# Patient Record
Sex: MALE | Race: WHITE | NOT HISPANIC OR LATINO | Employment: OTHER | URBAN - METROPOLITAN AREA
[De-identification: names, ages, dates, MRNs, and addresses within clinical notes are randomized per-mention and may not be internally consistent; named-entity substitution may affect disease eponyms.]

---

## 2019-07-01 ENCOUNTER — APPOINTMENT (OUTPATIENT)
Dept: GENERAL RADIOLOGY | Facility: CLINIC | Age: 64
End: 2019-07-01
Attending: EMERGENCY MEDICINE
Payer: COMMERCIAL

## 2019-07-01 ENCOUNTER — HOSPITAL ENCOUNTER (EMERGENCY)
Facility: CLINIC | Age: 64
Discharge: HOME | End: 2019-07-01
Attending: EMERGENCY MEDICINE | Admitting: EMERGENCY MEDICINE
Payer: COMMERCIAL

## 2019-07-01 ENCOUNTER — APPOINTMENT (OUTPATIENT)
Dept: MRI IMAGING | Facility: CLINIC | Age: 64
End: 2019-07-01
Attending: EMERGENCY MEDICINE
Payer: COMMERCIAL

## 2019-07-01 VITALS
TEMPERATURE: 97.5 F | RESPIRATION RATE: 20 BRPM | SYSTOLIC BLOOD PRESSURE: 118 MMHG | HEART RATE: 63 BPM | DIASTOLIC BLOOD PRESSURE: 60 MMHG | HEIGHT: 68 IN | OXYGEN SATURATION: 94 % | BODY MASS INDEX: 25.86 KG/M2 | WEIGHT: 170.64 LBS

## 2019-07-01 DIAGNOSIS — R42 VERTIGO: ICD-10-CM

## 2019-07-01 DIAGNOSIS — R21 RASH: ICD-10-CM

## 2019-07-01 DIAGNOSIS — J06.9 UPPER RESPIRATORY TRACT INFECTION, UNSPECIFIED TYPE: ICD-10-CM

## 2019-07-01 DIAGNOSIS — R25.1 TREMOR: ICD-10-CM

## 2019-07-01 LAB
ANION GAP SERPL CALCULATED.3IONS-SCNC: 3 MMOL/L (ref 3–14)
APTT PPP: 33 SEC (ref 22–37)
BASOPHILS # BLD AUTO: 0 10E9/L (ref 0–0.2)
BASOPHILS NFR BLD AUTO: 0.4 %
BUN SERPL-MCNC: 9 MG/DL (ref 7–30)
CALCIUM SERPL-MCNC: 8.7 MG/DL (ref 8.5–10.1)
CHLORIDE SERPL-SCNC: 104 MMOL/L (ref 94–109)
CO2 SERPL-SCNC: 30 MMOL/L (ref 20–32)
CREAT SERPL-MCNC: 0.94 MG/DL (ref 0.66–1.25)
DIFFERENTIAL METHOD BLD: ABNORMAL
EOSINOPHIL # BLD AUTO: 0.6 10E9/L (ref 0–0.7)
EOSINOPHIL NFR BLD AUTO: 8.1 %
ERYTHROCYTE [DISTWIDTH] IN BLOOD BY AUTOMATED COUNT: 12.9 % (ref 10–15)
ETHANOL SERPL-MCNC: <0.01 G/DL
GFR SERPL CREATININE-BSD FRML MDRD: 85 ML/MIN/{1.73_M2}
GLUCOSE SERPL-MCNC: 96 MG/DL (ref 70–99)
HCT VFR BLD AUTO: 39.6 % (ref 40–53)
HGB BLD-MCNC: 13.3 G/DL (ref 13.3–17.7)
IMM GRANULOCYTES # BLD: 0 10E9/L (ref 0–0.4)
IMM GRANULOCYTES NFR BLD: 0.1 %
INR PPP: 1.03 (ref 0.86–1.14)
LACTATE BLD-SCNC: 0.7 MMOL/L (ref 0.7–2)
LYMPHOCYTES # BLD AUTO: 1.2 10E9/L (ref 0.8–5.3)
LYMPHOCYTES NFR BLD AUTO: 17.5 %
MCH RBC QN AUTO: 30.9 PG (ref 26.5–33)
MCHC RBC AUTO-ENTMCNC: 33.6 G/DL (ref 31.5–36.5)
MCV RBC AUTO: 92 FL (ref 78–100)
MONOCYTES # BLD AUTO: 0.6 10E9/L (ref 0–1.3)
MONOCYTES NFR BLD AUTO: 8.1 %
NEUTROPHILS # BLD AUTO: 4.6 10E9/L (ref 1.6–8.3)
NEUTROPHILS NFR BLD AUTO: 65.8 %
NRBC # BLD AUTO: 0 10*3/UL
NRBC BLD AUTO-RTO: 0 /100
PLATELET # BLD AUTO: 231 10E9/L (ref 150–450)
POTASSIUM SERPL-SCNC: 3.8 MMOL/L (ref 3.4–5.3)
RBC # BLD AUTO: 4.3 10E12/L (ref 4.4–5.9)
SODIUM SERPL-SCNC: 137 MMOL/L (ref 133–144)
TROPONIN I SERPL-MCNC: <0.015 UG/L (ref 0–0.04)
WBC # BLD AUTO: 7 10E9/L (ref 4–11)

## 2019-07-01 PROCEDURE — 85730 THROMBOPLASTIN TIME PARTIAL: CPT | Performed by: EMERGENCY MEDICINE

## 2019-07-01 PROCEDURE — 80320 DRUG SCREEN QUANTALCOHOLS: CPT | Performed by: EMERGENCY MEDICINE

## 2019-07-01 PROCEDURE — 80048 BASIC METABOLIC PNL TOTAL CA: CPT | Performed by: EMERGENCY MEDICINE

## 2019-07-01 PROCEDURE — 25000132 ZZH RX MED GY IP 250 OP 250 PS 637: Performed by: EMERGENCY MEDICINE

## 2019-07-01 PROCEDURE — 93005 ELECTROCARDIOGRAM TRACING: CPT

## 2019-07-01 PROCEDURE — 71046 X-RAY EXAM CHEST 2 VIEWS: CPT

## 2019-07-01 PROCEDURE — 83605 ASSAY OF LACTIC ACID: CPT | Performed by: EMERGENCY MEDICINE

## 2019-07-01 PROCEDURE — 85025 COMPLETE CBC W/AUTO DIFF WBC: CPT | Performed by: EMERGENCY MEDICINE

## 2019-07-01 PROCEDURE — 87040 BLOOD CULTURE FOR BACTERIA: CPT | Performed by: EMERGENCY MEDICINE

## 2019-07-01 PROCEDURE — 85610 PROTHROMBIN TIME: CPT | Performed by: EMERGENCY MEDICINE

## 2019-07-01 PROCEDURE — 25000128 H RX IP 250 OP 636: Performed by: EMERGENCY MEDICINE

## 2019-07-01 PROCEDURE — 25500064 ZZH RX 255 OP 636: Performed by: EMERGENCY MEDICINE

## 2019-07-01 PROCEDURE — 36415 COLL VENOUS BLD VENIPUNCTURE: CPT | Performed by: EMERGENCY MEDICINE

## 2019-07-01 PROCEDURE — A9585 GADOBUTROL INJECTION: HCPCS | Performed by: EMERGENCY MEDICINE

## 2019-07-01 PROCEDURE — 96360 HYDRATION IV INFUSION INIT: CPT | Mod: 59

## 2019-07-01 PROCEDURE — 70553 MRI BRAIN STEM W/O & W/DYE: CPT

## 2019-07-01 PROCEDURE — 70549 MR ANGIOGRAPH NECK W/O&W/DYE: CPT

## 2019-07-01 PROCEDURE — 99285 EMERGENCY DEPT VISIT HI MDM: CPT | Mod: 25

## 2019-07-01 PROCEDURE — 84484 ASSAY OF TROPONIN QUANT: CPT | Performed by: EMERGENCY MEDICINE

## 2019-07-01 PROCEDURE — 96361 HYDRATE IV INFUSION ADD-ON: CPT

## 2019-07-01 RX ORDER — GADOBUTROL 604.72 MG/ML
10 INJECTION INTRAVENOUS ONCE
Status: COMPLETED | OUTPATIENT
Start: 2019-07-01 | End: 2019-07-01

## 2019-07-01 RX ORDER — DIAZEPAM 5 MG
5 TABLET ORAL
Qty: 3 TABLET | Refills: 0 | Status: SHIPPED | OUTPATIENT
Start: 2019-07-01 | End: 2019-07-04

## 2019-07-01 RX ORDER — MECLIZINE HYDROCHLORIDE 25 MG/1
25 TABLET ORAL ONCE
Status: COMPLETED | OUTPATIENT
Start: 2019-07-01 | End: 2019-07-01

## 2019-07-01 RX ORDER — HYDROXYZINE HYDROCHLORIDE 10 MG/1
10 TABLET, FILM COATED ORAL 2 TIMES DAILY PRN
Qty: 10 TABLET | Refills: 0 | Status: SHIPPED | OUTPATIENT
Start: 2019-07-01

## 2019-07-01 RX ORDER — MECLIZINE HYDROCHLORIDE 25 MG/1
25 TABLET ORAL EVERY 6 HOURS PRN
Qty: 30 TABLET | Refills: 0 | Status: SHIPPED | OUTPATIENT
Start: 2019-07-01

## 2019-07-01 RX ADMIN — SODIUM CHLORIDE 500 ML: 9 INJECTION, SOLUTION INTRAVENOUS at 10:19

## 2019-07-01 RX ADMIN — GADOBUTROL 10 ML: 604.72 INJECTION INTRAVENOUS at 11:32

## 2019-07-01 RX ADMIN — MECLIZINE HYDROCHLORIDE 25 MG: 25 TABLET ORAL at 10:36

## 2019-07-01 ASSESSMENT — MIFFLIN-ST. JEOR: SCORE: 1538.5

## 2019-07-01 NOTE — ED PROVIDER NOTES
Visit Date:   07/01/2019      PRIMARY CARE PHYSICIAN:  Gabrielle Hidalgo MD       CHIEF COMPLAINT:  Vertigo, cough and tremor.      HISTORY OF PRESENT ILLNESS:  A 63-year-old male with history of previous carotid endarterectomy, previous stroke and use of chronic pain medication for back pain, who presents to the ER.  He said that 2-1/2 weeks ago, he developed upper respiratory symptoms.  He had been exposed to a relative who had pneumonia and his wife was sick.  He said he has had fevers at home, cough, runny nose, sore throat.  He has a history of COPD, had noted some minimal cough and shortness of breath but it was not severe.  He said what mainly has been bothering him is a spinning sensation, especially when he walks up or down stairs.  It has been constant over the last week and he noticed a rash starting yesterday.  He said did not see any redness, but under the skin sees some small bumps.  He has had nausea but no vomiting.  No trauma.  He said he felt a little more unsteady but that is not unusual for him.  No severe headache or neck pain.      MEDICATIONS:     1.  Valium.   2.  Oxycodone.   3.  Fentanyl.   4.  Amitriptyline.   5.  Omeprazole.   6.  Triamcinolone cream.   7.  MiraLax.      ALLERGIES:  AMBIEN, ATENOLOL, ZITHROMAX, BACLOFEN, LYRICA, METHOCARBAMOL, METOPROLOL,  MORPHINE, PROPOXYPHENE, SIMVASTATIN, TIZANIDINE, TOPAMAX.      SOCIAL HISTORY:  The patient is here with his wife.  He has had a drink for 40 years.        PAST MEDICAL HISTORY:  History of small-bowel obstruction, chronic back pain, insomnia, constipation, carotid endarterectomy, stroke, COPD.      REVIEW OF SYSTEMS:  Ten-point review of systems all negative except as in the HPI.      PHYSICAL EXAMINATION:   GENERAL:  The patient is in no respiratory distress, speaks in full sentences, makes good sense.  He does have a mild tremor.   VITAL SIGNS:  Temperature 97.5, blood pressure 118/101 with a recheck of 118/60, heart rate 78,  respirations 20, saturating 96% on room air.   HEENT/NECK:  There is fluid behind the right TM greater than the left.  Mucous membranes are moist.  No scleral icterus.   HEMATOLOGIC Sclerae consistent with a non-anemic male.   LYMPHATICS:  No cervical lymphadenopathy.   CARDIOVASCULAR:  Regular rate and rhythm.   RESPIRATORY:  Lungs have prolonged expirations.  He does have frequent cough with that.   GASTROINTESTINAL:  The abdomen is soft and nontender.   SKIN:  Cool, pink and dry.  There are no rashes.  At the area where he points to a rash on the left arm, there are some small varicosities.   MUSCULOSKELETAL:  No deformity.   PSYCHIATRIC:  The patient is appropriate.   NEUROLOGIC:  He is alert, oriented, able to follow commands.  He does have horizontal nystagmus.  There is some minor tremor but no pronator drift.  Good strength.  He is able to walk without support but appears mildly unsteady.   PSYCHIATRIC:  Appropriate.      LABORATORY AND DIAGNOSTICS:  EKG demonstrates normal sinus rhythm, normal axis, no pathologic ST elevation with rate of 68, , QRS of 106 and QTc of 431.  CBC demonstrates normal white count 7.0, H and H of 13 and 39, platelets are normal.  BMP is unremarkable with creatinine 0.954.  Ethanol is negative.  His lactic acid level was 10.7.  Troponin normal at less than 0.015.  Alcohol was negative.  PTT 33, INR 1.03.  EKG is reassuring.  His chest x-ray was read by the radiologist as no acute pulmonary disease.  Neck MRA is negative and brain MRI showed mild cerebral atrophy, moderate extensive white matter changes which are nonspecific and are most likely due to chronic small-vessel ischemic changes, given the patient's age.  No evidence of acute hemorrhage, infarct, or any focal masses.  MRI was ordered on the suggestion, recommendation and guidance of Stroke Neurology.  I discussed performing CTA but they felt the MRA will be important to order.        EMERGENCY DEPARTMENT COURSE AND  DECISION MAKING:  The patient has a history of stroke and reports vertiginous-type symptoms.  He has horizontal nystagmus.  There was a recent URI and some differential of fluid behind the eardrums to suggest a peripheral vertigo, but I am concerned because he reports that it has not stopped.  The patient is stable here and is not a code stroke, but I did discuss the case with Dr. Slaughter of Stroke Neurology who recommended ordering both an MRI and MRA of the brain.  I considered other causes, especially with his tremor.  I did check an alcohol level, consider withdrawal although he has not drank recently.  There has been no trauma.  This could be multifactorial including infection with his cough, chest x-ray and labs including lactic acid.  The patient, however, is stable here and has been exposed to a upper respiratory infection.  His MRI showed signs of chronic small-vessel disease.  He has some known balance issues felt to be secondary to his joint.  With his history of carotid disease, stroke will be in the differential; however, MRI does not show signs of a stroke or dissection.  Case was discussed with the Stroke Neurology who felt that if this was negative, he did not require any further workup.  He is already on aspirin.  The patient is stable here.  Meclizine, he said, did not help him a lot, but I felt that starting him on a trial of further doses, some Valium and then also treating him with Atarax for itching would be helpful as well.  With the rash with some redness, he had some itching and it would help that, but I do not feel there is likely any other dermatologic problems.  I discussed having him following up with Dermatology as an outpatient.  There was some mild redness over his legs that he was scratching.  I did push on the area, the area did compa but I do not feel this is overt vasculitis.  His labs all returned reassuring.      The patient felt comfortable with the plan of going home versus  staying in the hospital.  He does agree to follow up with his primary care doctor.  He will likely need to see Neurology as an outpatient as well.  If the patient worsens, having problems breathing, the rash should change, he has chest pain, passes out or weakness, he would return, but there are no current signs of an active stroke,  He was discharged home in good condition.      PLAN:  Followup with primary care doctor in 3 days.  He will likely need to see Neurology as an outpatient as well.  Take meclizine, Valium at bedtime, and Atarax up to twice a day, avoiding walking afterwards.      DIAGNOSES:   1.  Vertigo.   2.  Tremor.   3.  Upper respiratory tract infection.   4.  Rash.         WILBER CRANE MD             D: 2019   T: 2019   MT: BRIANA      Name:     WILBER RENTERIA   MRN:      [id number]        Account:      [id number]   :      1955           Visit Date:   2019      Document: [id number]       cc: Gabrielle Hidalgo MD

## 2019-07-01 NOTE — ED TRIAGE NOTES
"Started with a cold, 2.5 weeks ago. Then developed a sore throat, now c/o wheezing, cough, dizzy. Fever at home Saturday, 102. Pt also notes a rash that started yesterday, states when he wipes his hand over it it feels \"wet\".  "

## 2019-07-01 NOTE — ED AVS SNAPSHOT
[facility] Emergency Department  [address]  [facility] [address]  Phone:  [phone]  Fax:  [phone]                                    Malick Cruz   MRN: [id number]    Department:  [facility] Emergency Department   Date of Visit:  7/1/2019           After Visit Summary Signature Page    I have received my discharge instructions, and my questions have been answered. I have discussed any challenges I see with this plan with the nurse or doctor.    ..........................................................................................................................................  Patient/Patient Representative Signature      ..........................................................................................................................................  Patient Representative Print Name and Relationship to Patient    ..................................................               ................................................  Date                                   Time    ..........................................................................................................................................  Reviewed by Signature/Title    ...................................................              ..............................................  Date                                               Time          22EPIC Rev 08/18

## 2019-07-01 NOTE — DISCHARGE INSTRUCTIONS
Discharge Instructions  Vertigo  You have been diagnosed with vertigo.  This is a feeling that you are spinning or that the room is moving around you. You will often have nausea, vomiting, and balance problems with it.  Vertigo is usually caused by a problem in the inner ear which helps control your balance.  Many things can cause vertigo, including calcium collections in the inner ear, a virus infection of the inner ear, concussion, migraine, and some medicines.  Luckily, these causes are not life threatening and will eventually go away.  However, sometimes there is a serious problem that does not show up right away.  Return to the Emergency Department if you have:  New or severe headache.  Temperature greater than 100.4 F (38 C).  Seeing double or having trouble seeing clearly.  Trouble speaking or hearing.  Weakness in an arm or leg.  Passing out.  Numbness or tingling.  Chest pain.  Vomiting that will not stop.    Treatment:  An antihistamine, such as Antivert  (meclizine), or non-prescription medicines like Dramamine  (dimenhydrinate), or Benadryl  (diphenhydramine).  Prescription anti-nausea medicines, such as Phenergan  (promethazine), Reglan  (metoclopramide), or Zofran  (ondansetron).  Prescription sedative medicines, such as Valium  (diazepam), Ativan  (lorazepam), or Klonopin  (clonazepam).  Most of these medicines make you sleepy, and you should not take them before you work or drive. You should only take prescription medicines to treat severe vertigo symptoms, and you should stop the medicine when your symptoms improve.    Follow Up:  If you have vertigo longer than three days, it is important that you follow up either with your primary doctor or an Ear Nose and Throat doctor.  You may need further testing to evaluate your vertigo and you may also need  vestibular  therapy which is a special form of physical therapy to make the vertigo go away.    If you were given a prescription for medicine here  today, be sure to read all of the information (including the package insert) that comes with your prescription.  This will include important information about the medicine, its side effects, and any warnings that you need to know about.  The pharmacist who fills the prescription can provide more information and answer questions you may have about the medicine.  If you have questions or concerns that the pharmacist cannot address, please call or return to the Emergency Department.       Opioid Medication Information    Pain medications are among the most commonly prescribed medicines, so we are including this information for all our patients. If you did not receive pain medication or get a prescription for pain medicine, you can ignore it.     You may have been given a prescription for an opioid (narcotic) pain medicine and/or have received a pain medicine while here in the Emergency Department. These medicines can make you drowsy or impaired. You must not drive, operate dangerous equipment, or engage in any other dangerous activities while taking these medications. If you drive while taking these medications, you could be arrested for DUI, or driving under the influence. Do not drink any alcohol while you are taking these medications.     Opioid pain medications can cause addiction. If you have a history of chemical dependency of any type, you are at a higher risk of becoming addicted to pain medications.  Only take these prescribed medications to treat your pain when all other options have been tried. Take it for as short a time and as few doses as possible. Store your pain pills in a secure place, as they are frequently stolen and provide a dangerous opportunity for children or visitors in your house to start abusing these powerful medications. We will not replace any lost or stolen medicine.  As soon as your pain is better, you should flush all your remaining medication.     Many prescription pain medications  contain Tylenol  (acetaminophen), including Vicodin , Tylenol #3 , Norco , Lortab , and Percocet .  You should not take any extra pills of Tylenol  if you are using these prescription medications or you can get very sick.  Do not ever take more than 3000 mg of acetaminophen in any 24 hour period.    All opioids tend to cause constipation. Drink plenty of water and eat foods that have a lot of fiber, such as fruits, vegetables, prune juice, apple juice and high fiber cereal.  Take a laxative if you don t move your bowels at least every other day. Miralax , Milk of Magnesia, Colace , or Senna  can be used to keep you regular.      Remember that you can always come back to the Emergency Department if you are not able to see your regular doctor in the amount of time listed above, if you get any new symptoms, or if there is anything that worries you.

## 2019-07-02 LAB — INTERPRETATION ECG - MUSE: NORMAL

## 2019-07-07 LAB
BACTERIA SPEC CULT: NO GROWTH
BACTERIA SPEC CULT: NO GROWTH
Lab: NORMAL
Lab: NORMAL
SPECIMEN SOURCE: NORMAL
SPECIMEN SOURCE: NORMAL

## 2020-12-29 ENCOUNTER — APPOINTMENT (OUTPATIENT)
Dept: GENERAL RADIOLOGY | Facility: CLINIC | Age: 65
End: 2020-12-29
Attending: EMERGENCY MEDICINE
Payer: COMMERCIAL

## 2020-12-29 ENCOUNTER — APPOINTMENT (OUTPATIENT)
Dept: ULTRASOUND IMAGING | Facility: CLINIC | Age: 65
End: 2020-12-29
Attending: EMERGENCY MEDICINE
Payer: COMMERCIAL

## 2020-12-29 ENCOUNTER — HOSPITAL ENCOUNTER (EMERGENCY)
Facility: CLINIC | Age: 65
Discharge: HOME | End: 2020-12-29
Attending: EMERGENCY MEDICINE | Admitting: EMERGENCY MEDICINE
Payer: COMMERCIAL

## 2020-12-29 VITALS
SYSTOLIC BLOOD PRESSURE: 141 MMHG | DIASTOLIC BLOOD PRESSURE: 64 MMHG | WEIGHT: 149.03 LBS | RESPIRATION RATE: 18 BRPM | OXYGEN SATURATION: 99 % | HEART RATE: 60 BPM | BODY MASS INDEX: 22.66 KG/M2 | TEMPERATURE: 97.5 F

## 2020-12-29 DIAGNOSIS — K80.20 GALLSTONES: ICD-10-CM

## 2020-12-29 DIAGNOSIS — R10.13 ABDOMINAL PAIN, EPIGASTRIC: ICD-10-CM

## 2020-12-29 DIAGNOSIS — R06.02 SOB (SHORTNESS OF BREATH): ICD-10-CM

## 2020-12-29 LAB
ALBUMIN SERPL-MCNC: 3.6 G/DL (ref 3.4–5)
ALP SERPL-CCNC: 114 U/L (ref 40–150)
ALT SERPL W P-5'-P-CCNC: 14 U/L (ref 0–70)
ANION GAP SERPL CALCULATED.3IONS-SCNC: 4 MMOL/L (ref 3–14)
AST SERPL W P-5'-P-CCNC: 14 U/L (ref 0–45)
BASOPHILS # BLD AUTO: 0 10E9/L (ref 0–0.2)
BASOPHILS NFR BLD AUTO: 0.4 %
BILIRUB SERPL-MCNC: 0.3 MG/DL (ref 0.2–1.3)
BUN SERPL-MCNC: 8 MG/DL (ref 7–30)
CALCIUM SERPL-MCNC: 9.5 MG/DL (ref 8.5–10.1)
CHLORIDE SERPL-SCNC: 105 MMOL/L (ref 94–109)
CO2 SERPL-SCNC: 32 MMOL/L (ref 20–32)
CREAT SERPL-MCNC: 0.88 MG/DL (ref 0.66–1.25)
DIFFERENTIAL METHOD BLD: NORMAL
EOSINOPHIL # BLD AUTO: 0.5 10E9/L (ref 0–0.7)
EOSINOPHIL NFR BLD AUTO: 5.8 %
ERYTHROCYTE [DISTWIDTH] IN BLOOD BY AUTOMATED COUNT: 12.1 % (ref 10–15)
GFR SERPL CREATININE-BSD FRML MDRD: 90 ML/MIN/{1.73_M2}
GLUCOSE SERPL-MCNC: 79 MG/DL (ref 70–99)
HCT VFR BLD AUTO: 43.7 % (ref 40–53)
HGB BLD-MCNC: 14.7 G/DL (ref 13.3–17.7)
IMM GRANULOCYTES # BLD: 0 10E9/L (ref 0–0.4)
IMM GRANULOCYTES NFR BLD: 0.2 %
LIPASE SERPL-CCNC: 59 U/L (ref 73–393)
LYMPHOCYTES # BLD AUTO: 3.4 10E9/L (ref 0.8–5.3)
LYMPHOCYTES NFR BLD AUTO: 36.7 %
MCH RBC QN AUTO: 31 PG (ref 26.5–33)
MCHC RBC AUTO-ENTMCNC: 33.6 G/DL (ref 31.5–36.5)
MCV RBC AUTO: 92 FL (ref 78–100)
MONOCYTES # BLD AUTO: 0.6 10E9/L (ref 0–1.3)
MONOCYTES NFR BLD AUTO: 6.1 %
NEUTROPHILS # BLD AUTO: 4.6 10E9/L (ref 1.6–8.3)
NEUTROPHILS NFR BLD AUTO: 50.8 %
NRBC # BLD AUTO: 0 10*3/UL
NRBC BLD AUTO-RTO: 0 /100
PLATELET # BLD AUTO: 303 10E9/L (ref 150–450)
POTASSIUM SERPL-SCNC: 3.9 MMOL/L (ref 3.4–5.3)
PROT SERPL-MCNC: 7 G/DL (ref 6.8–8.8)
RBC # BLD AUTO: 4.74 10E12/L (ref 4.4–5.9)
SODIUM SERPL-SCNC: 141 MMOL/L (ref 133–144)
TROPONIN I SERPL-MCNC: <0.015 UG/L (ref 0–0.04)
WBC # BLD AUTO: 9.2 10E9/L (ref 4–11)

## 2020-12-29 PROCEDURE — 71045 X-RAY EXAM CHEST 1 VIEW: CPT

## 2020-12-29 PROCEDURE — 83690 ASSAY OF LIPASE: CPT | Performed by: EMERGENCY MEDICINE

## 2020-12-29 PROCEDURE — 258N000003 HC RX IP 258 OP 636: Performed by: EMERGENCY MEDICINE

## 2020-12-29 PROCEDURE — 85025 COMPLETE CBC W/AUTO DIFF WBC: CPT | Performed by: EMERGENCY MEDICINE

## 2020-12-29 PROCEDURE — 84484 ASSAY OF TROPONIN QUANT: CPT | Performed by: EMERGENCY MEDICINE

## 2020-12-29 PROCEDURE — 96375 TX/PRO/DX INJ NEW DRUG ADDON: CPT

## 2020-12-29 PROCEDURE — 96361 HYDRATE IV INFUSION ADD-ON: CPT

## 2020-12-29 PROCEDURE — 99285 EMERGENCY DEPT VISIT HI MDM: CPT | Mod: 25

## 2020-12-29 PROCEDURE — 93005 ELECTROCARDIOGRAM TRACING: CPT

## 2020-12-29 PROCEDURE — 76705 ECHO EXAM OF ABDOMEN: CPT

## 2020-12-29 PROCEDURE — 250N000011 HC RX IP 250 OP 636: Performed by: EMERGENCY MEDICINE

## 2020-12-29 PROCEDURE — 80053 COMPREHEN METABOLIC PANEL: CPT | Performed by: EMERGENCY MEDICINE

## 2020-12-29 PROCEDURE — 96374 THER/PROPH/DIAG INJ IV PUSH: CPT

## 2020-12-29 RX ORDER — ONDANSETRON 2 MG/ML
4 INJECTION INTRAMUSCULAR; INTRAVENOUS EVERY 30 MIN PRN
Status: DISCONTINUED | OUTPATIENT
Start: 2020-12-29 | End: 2020-12-29 | Stop reason: HOSPADM

## 2020-12-29 RX ORDER — KETOROLAC TROMETHAMINE 15 MG/ML
15 INJECTION, SOLUTION INTRAMUSCULAR; INTRAVENOUS ONCE
Status: COMPLETED | OUTPATIENT
Start: 2020-12-29 | End: 2020-12-29

## 2020-12-29 RX ORDER — HYDROCODONE BITARTRATE AND ACETAMINOPHEN 5; 325 MG/1; MG/1
1 TABLET ORAL EVERY 6 HOURS PRN
Qty: 6 TABLET | Refills: 0 | Status: SHIPPED | OUTPATIENT
Start: 2020-12-29

## 2020-12-29 RX ORDER — SODIUM CHLORIDE 9 MG/ML
INJECTION, SOLUTION INTRAVENOUS CONTINUOUS
Status: DISCONTINUED | OUTPATIENT
Start: 2020-12-29 | End: 2020-12-29 | Stop reason: HOSPADM

## 2020-12-29 RX ADMIN — ONDANSETRON 4 MG: 2 INJECTION INTRAMUSCULAR; INTRAVENOUS at 18:04

## 2020-12-29 RX ADMIN — SODIUM CHLORIDE 1000 ML: 9 INJECTION, SOLUTION INTRAVENOUS at 18:04

## 2020-12-29 RX ADMIN — KETOROLAC TROMETHAMINE 15 MG: 15 INJECTION, SOLUTION INTRAMUSCULAR; INTRAVENOUS at 18:04

## 2020-12-29 ASSESSMENT — ENCOUNTER SYMPTOMS
VOMITING: 1
TROUBLE SWALLOWING: 1
LIGHT-HEADEDNESS: 1
APPETITE CHANGE: 1
NAUSEA: 1
FEVER: 0
COUGH: 0
CHILLS: 0
ABDOMINAL PAIN: 1
DIARRHEA: 1

## 2020-12-29 NOTE — ED PROVIDER NOTES
History     Chief Complaint:  Decreased Appetite and Abdominal pain     HPI  Malick Cruz is a 65 year old male with a history of CAD, chronic back pain, and SBO who presents for evaluation of decreased appetite with associated abdominal pain. He feels as though when he eats it feels as though it gets stuck in his esophagus and causes him to be short of breath. It feels as though he is full and forces him to feel as though he cannot eat at all.  There has been associated nausea and emesis 30 minutes after he eats with exacerbation of his abdominal cramping. He has lost weight in the last month. He will have a normal or diarrhea like bowel movement every other day. Last night he began to experience cramping throughout his extremities. He feels lightheaded but denies it to be syncopal.  This has been present for the last month but increased in severity 2 weeks ago. He denies any fevers, cough, chest pain or chills. His son tested positive for Covid 3 weeks ago.       Review of Systems   Constitutional: Positive for appetite change. Negative for chills and fever.   HENT: Positive for trouble swallowing.    Respiratory: Negative for cough.    Cardiovascular: Negative for chest pain.   Gastrointestinal: Positive for abdominal pain (cramping), diarrhea, nausea and vomiting.   Neurological: Positive for light-headedness. Negative for syncope.   All other systems reviewed and are negative.        Allergies:  Ambien [Zolpidem]  Atenolol  Azithromycin  Baclofen  Lyrica [Pregabalin]  Methocarbamol  Metoprolol  Morphine  Propoxyphene  Simvastatin  Tizanidine  Topamax [Topiramate]     Medications:   Amitriptyline   Docusate   Fentanyl   Atarax   Antivert  Omeprazole   Oxycodone    Medical History:   Chronic back pain   Chronic constipation  Insomnia   SBO  Sensory ataxia  Hx of stroke   Chronic migraine  Tremor   Hyperlipidemia    Major depression   CAD     Surgical History   5 lower back surgeries   Left carotid  endarterectomy   Right knee replacement    Family History:   Father:  Cataract, heart attack     Social History:  Patient presents alone.  PCP: Gabrielle Hidalgo   Patient lives at home with his wife.    Physical Exam     Patient Vitals for the past 24 hrs:   BP Temp Temp src Pulse Resp SpO2 Weight   12/29/20 1705 (!) 140/64 97.5  F (36.4  C) Oral 54 18 100 % 67.6 kg (149 lb 0.5 oz)          Physical Exam    General: The patient is alert, in no respiratory distress.    HENT: Mucous membranes moist.    Cardiovascular: Regular rate and rhythm. Good pulses in all four extremities. Normal capillary refill and skin turgor.     Respiratory: Lungs are clear. No nasal flaring. No retractions. No wheezing, no crackles.    Gastrointestinal: Abdomen soft. No guarding, no rebound. No palpable hernias. Mild epigastric tenderness.     Musculoskeletal: No gross deformity.     Skin: No rashes or petechiae.     Neurologic: The patient is alert and oriented x3. GCS 15. No testable cranial nerve deficit. Follows commands with clear and appropriate speech. Gives appropriate answers. Good strength in all extremities. No gross neurologic deficit. Gross sensation intact. Pupils are round and reactive. No meningismus.     Lymphatic: No cervical adenopathy. No lower extremity swelling.    Psychiatric: The patient is non-tearful.     Emergency Department Course     ECG:  ECG taken at 1717, ECG read at 1728  Sinus bradycardia   Otherwise normal ECG  Rate 53 bpm. AL interval 136 ms. QRS duration 96 ms. QT/QTc 426/399 ms. P-R-T axes 15 70 61.     Imaging:    US Abdomen Limited  1.  Cholelithiasis.  2.  Mildly dilated common bile duct correlate clinically for any signs or symptoms of biliary obstruction and consider follow-up MRCP if indicated.    XR Chest Port 1 View  Scoliosis. Normal heart size. Lungs clear. Thoracic osteophytic change.    Reading per radiology     Laboratory:    CBC: WBC 9.2, HGB 14.7,   CMP: AWNL (Creatinine  0.88)    Troponin: (Collected 1727) <0.015   Lipase: 59 (L)    Emergency Department Course:    Reviewed:  I reviewed the patient's nursing notes, vitals, past medical records, Care Everywhere.     Assessments:  1715 I assessed the patient as above.     2025 Patient rechecked and updated.  Patient prefers discharge to admission at this time. Will plan for discharge.     2035 I spoke with Dr. Dunn of the hospitalist service who agrees with plan for patient to follow up in the out patient setting.     Interventions:  1804 NS 1000 mL IV   1804 Zofran 4 mg IV   1804 Toradol 15 mg IV      Disposition:  The patient is discharged to home.     Impression & Plan     Medical Decision Making:  Malick Cruz is a 65 year old male who presents complaining of epigastric abdominal pain with weight loss over the last month.  He has had a work-up at [facility] including an EGD where he was told that looked normal.  He has had some laboratory studies.  With his description of symptoms however including post prandial pain fullness decreased appetite I was highly suspicious about biliary colic but also took a broad differential and considered pancreatitis gastritis and other intra-abdominal processes.  He reported already having a CT scan which did not show obstruction or diverticulitis therefore this was not repeated.  His labs here looked reassuring however his ultrasound showed gallstones with a mildly dilated duct.  With his LFTs not being elevated and after discussion with GI the patient will follow up with his primary care doctor but I think he does deserve to have his gallbladder taken out and that may help with his symptoms.  Otherwise he is stable.  The patient is aware that we have not excluded cancers or other conditions and he will need further testing including likely a colonoscopy.  Symptoms return for discussed and he was well hydrated feeling improved.    Diagnosis:     ICD-10-CM    1. Abdominal pain,  epigastric  R10.13    2. Gallstones  K80.20    3. SOB (shortness of breath)  R06.02         Disposition:  Discharged to home.    Discharge Medications:  New Prescriptions    HYDROCODONE-ACETAMINOPHEN (NORCO) 5-325 MG TABLET    Take 1 tablet by mouth every 6 hours as needed for severe pain       Scribe Disclosure:  I, Ray Perez, am serving as a scribe at 5:11 PM on 12/29/2020 to document services personally performed by Malick Dexter Md MD based on my observations and the provider's statements to me.     This note was completed in part using Dragon voice recognition software. Although reviewed after completion, some word and grammatical errors may occur.     [facility] [place]  December 29, 2020      Malick Dexter MD  12/29/20 [id number]

## 2020-12-29 NOTE — ED AVS SNAPSHOT
[facility] Emergency Dept  [address]  [facility] [address]  Phone: [phone]  Fax: [phone]                                    Malick Cruz   MRN: [id number]    Department: [facility] Emergency Dept   Date of Visit: 12/29/2020           After Visit Summary Signature Page    I have received my discharge instructions, and my questions have been answered. I have discussed any challenges I see with this plan with the nurse or doctor.    ..........................................................................................................................................  Patient/Patient Representative Signature      ..........................................................................................................................................  Patient Representative Print Name and Relationship to Patient    ..................................................               ................................................  Date                                   Time    ..........................................................................................................................................  Reviewed by Signature/Title    ...................................................              ..............................................  Date                                               Time          22EPIC Rev 08/18

## 2020-12-29 NOTE — ED TRIAGE NOTES
"Pt arrives to the ED due to increased dizziness over past couple of days along with intermittent vomiting. Pt c/o of cramps in arms, legs and abdomen. Pt states that over the past month he has been losing weight. Pt has an apt with a GI specialist on Jan 4th to see why he is continuing to lose weight. Pt states \"I just feel full\" \"So I don't want to eat\". \"The fullness will sometimes make me feel SOB\".  Denies chest pain. Wife states that she had checked his pulse at home today and found it to be in the 40's.   "

## 2020-12-30 LAB — INTERPRETATION ECG - MUSE: NORMAL

## 2021-03-29 ENCOUNTER — HOSPITAL ENCOUNTER (EMERGENCY)
Facility: CLINIC | Age: 66
Discharge: HOME | End: 2021-03-29
Attending: PHYSICIAN ASSISTANT | Admitting: PHYSICIAN ASSISTANT
Payer: COMMERCIAL

## 2021-03-29 ENCOUNTER — APPOINTMENT (OUTPATIENT)
Dept: MRI IMAGING | Facility: CLINIC | Age: 66
End: 2021-03-29
Attending: PHYSICIAN ASSISTANT
Payer: COMMERCIAL

## 2021-03-29 VITALS
RESPIRATION RATE: 18 BRPM | OXYGEN SATURATION: 91 % | TEMPERATURE: 97.4 F | SYSTOLIC BLOOD PRESSURE: 131 MMHG | HEART RATE: 51 BPM | DIASTOLIC BLOOD PRESSURE: 78 MMHG

## 2021-03-29 DIAGNOSIS — M51.26 LUMBAR HERNIATED DISC: ICD-10-CM

## 2021-03-29 DIAGNOSIS — M21.371 FOOT DROP, RIGHT: ICD-10-CM

## 2021-03-29 DIAGNOSIS — M54.41 ACUTE RIGHT-SIDED LOW BACK PAIN WITH RIGHT-SIDED SCIATICA: ICD-10-CM

## 2021-03-29 LAB
ALBUMIN UR-MCNC: NEGATIVE MG/DL
ANION GAP SERPL CALCULATED.3IONS-SCNC: 6 MMOL/L (ref 3–14)
APPEARANCE UR: CLEAR
BASOPHILS # BLD AUTO: 0 10E9/L (ref 0–0.2)
BASOPHILS NFR BLD AUTO: 0.3 %
BILIRUB UR QL STRIP: NEGATIVE
BUN SERPL-MCNC: 12 MG/DL (ref 7–30)
CALCIUM SERPL-MCNC: 8.8 MG/DL (ref 8.5–10.1)
CHLORIDE SERPL-SCNC: 104 MMOL/L (ref 94–109)
CO2 SERPL-SCNC: 27 MMOL/L (ref 20–32)
COLOR UR AUTO: NORMAL
CREAT SERPL-MCNC: 0.79 MG/DL (ref 0.66–1.25)
DIFFERENTIAL METHOD BLD: ABNORMAL
EOSINOPHIL # BLD AUTO: 0 10E9/L (ref 0–0.7)
EOSINOPHIL NFR BLD AUTO: 0.3 %
ERYTHROCYTE [DISTWIDTH] IN BLOOD BY AUTOMATED COUNT: 13.2 % (ref 10–15)
GFR SERPL CREATININE-BSD FRML MDRD: >90 ML/MIN/{1.73_M2}
GLUCOSE SERPL-MCNC: 90 MG/DL (ref 70–99)
GLUCOSE UR STRIP-MCNC: NEGATIVE MG/DL
HCT VFR BLD AUTO: 41 % (ref 40–53)
HGB BLD-MCNC: 14.2 G/DL (ref 13.3–17.7)
HGB UR QL STRIP: NEGATIVE
IMM GRANULOCYTES # BLD: 0.1 10E9/L (ref 0–0.4)
IMM GRANULOCYTES NFR BLD: 0.4 %
KETONES UR STRIP-MCNC: NEGATIVE MG/DL
LEUKOCYTE ESTERASE UR QL STRIP: NEGATIVE
LYMPHOCYTES # BLD AUTO: 2.5 10E9/L (ref 0.8–5.3)
LYMPHOCYTES NFR BLD AUTO: 21.1 %
MCH RBC QN AUTO: 31 PG (ref 26.5–33)
MCHC RBC AUTO-ENTMCNC: 34.6 G/DL (ref 31.5–36.5)
MCV RBC AUTO: 90 FL (ref 78–100)
MONOCYTES # BLD AUTO: 0.5 10E9/L (ref 0–1.3)
MONOCYTES NFR BLD AUTO: 4.6 %
NEUTROPHILS # BLD AUTO: 8.5 10E9/L (ref 1.6–8.3)
NEUTROPHILS NFR BLD AUTO: 73.3 %
NITRATE UR QL: NEGATIVE
NRBC # BLD AUTO: 0 10*3/UL
NRBC BLD AUTO-RTO: 0 /100
PH UR STRIP: 7 PH (ref 5–7)
PLATELET # BLD AUTO: 276 10E9/L (ref 150–450)
POTASSIUM SERPL-SCNC: 4.3 MMOL/L (ref 3.4–5.3)
RBC # BLD AUTO: 4.58 10E12/L (ref 4.4–5.9)
RBC #/AREA URNS AUTO: 2 /HPF (ref 0–2)
SODIUM SERPL-SCNC: 137 MMOL/L (ref 133–144)
SOURCE: NORMAL
SP GR UR STRIP: 1.01 (ref 1–1.03)
UROBILINOGEN UR STRIP-MCNC: NORMAL MG/DL (ref 0–2)
WBC # BLD AUTO: 11.6 10E9/L (ref 4–11)
WBC #/AREA URNS AUTO: <1 /HPF (ref 0–5)

## 2021-03-29 PROCEDURE — 72158 MRI LUMBAR SPINE W/O & W/DYE: CPT

## 2021-03-29 PROCEDURE — 99285 EMERGENCY DEPT VISIT HI MDM: CPT | Mod: 25

## 2021-03-29 PROCEDURE — A9585 GADOBUTROL INJECTION: HCPCS | Performed by: PHYSICIAN ASSISTANT

## 2021-03-29 PROCEDURE — 255N000002 HC RX 255 OP 636: Performed by: PHYSICIAN ASSISTANT

## 2021-03-29 PROCEDURE — 250N000011 HC RX IP 250 OP 636

## 2021-03-29 PROCEDURE — 81001 URINALYSIS AUTO W/SCOPE: CPT | Performed by: PHYSICIAN ASSISTANT

## 2021-03-29 PROCEDURE — 80048 BASIC METABOLIC PNL TOTAL CA: CPT | Performed by: PHYSICIAN ASSISTANT

## 2021-03-29 PROCEDURE — 96374 THER/PROPH/DIAG INJ IV PUSH: CPT | Mod: 59

## 2021-03-29 PROCEDURE — 85025 COMPLETE CBC W/AUTO DIFF WBC: CPT | Performed by: PHYSICIAN ASSISTANT

## 2021-03-29 PROCEDURE — 250N000011 HC RX IP 250 OP 636: Performed by: PHYSICIAN ASSISTANT

## 2021-03-29 RX ORDER — GADOBUTROL 604.72 MG/ML
7.5 INJECTION INTRAVENOUS ONCE
Status: COMPLETED | OUTPATIENT
Start: 2021-03-29 | End: 2021-03-29

## 2021-03-29 RX ORDER — HYDROMORPHONE HYDROCHLORIDE 1 MG/ML
INJECTION, SOLUTION INTRAMUSCULAR; INTRAVENOUS; SUBCUTANEOUS
Status: COMPLETED
Start: 2021-03-29 | End: 2021-03-29

## 2021-03-29 RX ORDER — HYDROMORPHONE HYDROCHLORIDE 1 MG/ML
0.5 INJECTION, SOLUTION INTRAMUSCULAR; INTRAVENOUS; SUBCUTANEOUS
Status: COMPLETED | OUTPATIENT
Start: 2021-03-29 | End: 2021-03-29

## 2021-03-29 RX ORDER — HYDROMORPHONE HYDROCHLORIDE 1 MG/ML
0.5 INJECTION, SOLUTION INTRAMUSCULAR; INTRAVENOUS; SUBCUTANEOUS
Status: DISCONTINUED | OUTPATIENT
Start: 2021-03-29 | End: 2021-03-29

## 2021-03-29 RX ORDER — OXYCODONE HYDROCHLORIDE 5 MG/1
5 TABLET ORAL EVERY 6 HOURS PRN
Qty: 8 TABLET | Refills: 0 | Status: SHIPPED | OUTPATIENT
Start: 2021-03-29

## 2021-03-29 RX ORDER — OXYCODONE HYDROCHLORIDE 5 MG/1
5 TABLET ORAL ONCE
Status: DISCONTINUED | OUTPATIENT
Start: 2021-03-29 | End: 2021-03-29 | Stop reason: HOSPADM

## 2021-03-29 RX ADMIN — HYDROMORPHONE HYDROCHLORIDE 0.5 MG: 1 INJECTION, SOLUTION INTRAMUSCULAR; INTRAVENOUS; SUBCUTANEOUS at 16:56

## 2021-03-29 RX ADMIN — GADOBUTROL 6 ML: 604.72 INJECTION INTRAVENOUS at 18:56

## 2021-03-29 RX ADMIN — HYDROMORPHONE HYDROCHLORIDE 0.5 MG: 1 INJECTION, SOLUTION INTRAMUSCULAR; INTRAVENOUS; SUBCUTANEOUS at 18:18

## 2021-03-29 ASSESSMENT — ENCOUNTER SYMPTOMS
ABDOMINAL PAIN: 1
CHILLS: 0
DIFFICULTY URINATING: 1
DIARRHEA: 0
COUGH: 0
NECK PAIN: 0
BACK PAIN: 1
VOMITING: 0
WEAKNESS: 1
SHORTNESS OF BREATH: 0
MYALGIAS: 1
FEVER: 0
CONSTIPATION: 1
FREQUENCY: 0
NAUSEA: 0
DYSURIA: 0

## 2021-03-29 NOTE — ED TRIAGE NOTES
Hx of chronic back pain. Currently having worsening of symptoms in right leg, foot drop, intermittent paralyzation. Denies loss of bowel or bladder. MD referred here for MRI

## 2021-03-29 NOTE — ED PROVIDER NOTES
"  History   Chief Complaint:  Back Pain     The history is provided by the patient.     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 He reports developing a right foot drop three weeks ago, along with worsening back pain. At that time, he met with his back specialist, Dr. Nixon, who started him on a prednisone taper. Despite the steroids, he reports the right foot drop and the \"nerve pain\" in his right leg have progressively worsened. Yesterday, he reports being unable to get out of bed because his right lower extremity felt paralyzed. With this acute worsening, he called his spine specialist today and was referred to the ED for a MRI with concern for nerve compression.     Here, he reports low back pain radiating into his right leg which he describes as if someone were \"pounding hot nails into [his] leg.\" He reports it starts in his low back and radiates down his right leg into his feet. He does continue to report the right foot drop but states it has been intermittent. He states the pain is so bad he cannot tell if his groin is numb, but he denies fever, chills, or episodes of bowel/bladder incontinence. He does report it is becoming progressively more difficult to urinate, but denies frequency, urgency, dysuria, or hematuria. He further denies chest pain, shortness of breath, or acute abdominal pain. He also reports no recent trauma to his low back which could have triggered this worsening. His current pain control regimen includes oxycodone, Valium, and fentanyl patches; wife reports they are trying to minimize narcotic use due to the constipation it is causing. He is also 3.5 weeks s/p cholecystectomy; he has been recovering well, but had dealt with GI symptoms for 4-5 months prior to surgery which caused weight loss.     Allergies:  Ambien " [Zolpidem]  Atenolol  Azithromycin  Baclofen  Cyclobenzaprine   Gabapentin   Metoclopramide  Rizatriptan  Lyrica [Pregabalin]  Methocarbamol  Metoprolol  Morphine  Propoxyphene  Simvastatin  Tizanidine  Topamax [Topiramate]    Medications:    Prilosec   Baby aspirin   Narcan PRN   Albuterol inhaler   Beclomethasone inhaler   Trazodone    Oxycodone   Fentanyl patches   Valium     Past Medical History:    Chronic back pain   Biliary colic  Sensory ataxia  Cognitive changes   History of stroke   Migraines   Tremor   Controlled substance agreement signed - with Dr. Hidalgo   Cerebrovascular disease  Hyperlipidemia  Left ICA stenosis   Failed back surgical syndrome   Insomnia   SBO   Osteoarthritis  Depression     Past Surgical History:    Low back surgery x6   Left carotid endarterectomy   Bilateral knee replacements  Right carpal tunnel release      Family History:    Father: MI in 60s   Brother: MI in 60s , Stroke in 50-60s    Social History:  Presents with his wife.     Review of Systems   Constitutional: Negative for chills and fever.   Respiratory: Negative for cough and shortness of breath.    Cardiovascular: Negative for chest pain.   Gastrointestinal: Positive for abdominal pain and constipation. Negative for diarrhea, nausea and vomiting.   Genitourinary: Positive for difficulty urinating. Negative for dysuria, frequency and urgency.   Musculoskeletal: Positive for back pain and myalgias (R. leg pain). Negative for neck pain.   Neurological: Positive for weakness (R. leg ).   All other systems reviewed and are negative.    Physical Exam     Patient Vitals for the past 24 hrs:   BP Temp Temp src Pulse Resp SpO2   03/29/21 1515 135/72 97.4  F (36.3  C) Temporal 98 18 97 %        Physical Exam  Constitutional: Pleasant. Cooperative.   Eyes: Pupils equally round and reactive  HENT: Head is normal in appearance. Oropharynx is normal with moist mucus membranes.  Cardiovascular: Regular rate and rhythm and without  murmurs.  Respiratory: Normal respiratory effort, lungs are clear bilaterally.  GI: Abdomen is soft, non-tender, non-distended. No guarding, rebound, or rigidity.  Musculoskeletal: No midline spinal tenderness. 5/5 strength with hip flexion, knee flexion, knee extension, dorsiflexion, and plantarflexion to left lower extremity. 3/5 strength throughout to right lower extremity.  Skin: Normal, without rash.  Neurologic: Cranial nerves grossly intact, normal cognition, no focal deficits. Alert and oriented x 3.  Sensation to light touch intact in bilateral lower extremities.   Psychiatric: Normal affect.  Nursing notes and vital signs reviewed.    Emergency Department Course     Imaging:  Lumbar spine MRI w & w/o contrast - surgery <10yrs    (Results Pending)     Laboratory:  CBC: WBC 11.6 (H), HGB: 14.2, PLT: 276  BMP: All WNL (Creatinine: 0.79)    UA with microscopic: All WNL     Emergency Department Course:    Reviewed:  I reviewed the patient's nursing notes, vitals, past medical records, and Care Everywhere.     Assessments:  1549: I performed an exam of the patient, as documented above. History obtained and plan for ED work up discussed as well.     Interventions:  Medications   oxyCODONE (ROXICODONE) tablet 5 mg (0 mg Oral Hold 3/29/21 1705)   HYDROmorphone (PF) (DILAUDID) injection 0.5 mg (0.5 mg Intravenous Given 3/29/21 1818)   HYDROmorphone (PF) (DILAUDID) 0.5 MG/0.5 ML injection (0.5 mg  Given 3/29/21 1656)   gadobutrol (GADAVIST) injection 7.5 mL (6 mLs Intravenous Given 3/29/21 1856)       Disposition:  Care of the patient was transferred to my colleague Dr. Yu pending MRI results.     Impression & Plan      Medical Decision Making:   Malick Cruz is a 65 year old male who presents to the ED for evaluation of back pain. This is atraumatic. Patient has long history of back pain with multiple prior back surgeries. Patient has had three weeks of foot drop, placed on prednisone taper by his  orthopedist. Symptoms have persisted, and he was sent to the ED via orthopedics for MRI. See HPI as above for additional details. Vitals and physical exam as above. DDx was broad and included herniated disc, compression fracture, vertebral fracture, cauda equina, kidney stone, pyelo, among others. No red flag symptoms. Lab work reassuring as above. MRI pending at time of this dictation. Patient signed out to Dr. Yu for ultimate disposition.    Diagnosis:     ICD-10-CM    1. Acute right-sided low back pain with right-sided sciatica  M54.41    2. Foot drop, right  M21.371        Scribe Disclosure:  I, Alexsandra Smithjuni, am serving as a scribe on 3/29/2021 at 3:49 PM to personally document services performed by Patrick Quinones PA-C based on my observations and the provider's statements to me.      3/29/2021   EMERGENCY DEPARTMENT    This record was created at least in part using electronic voice recognition software, so please excuse any typographical errors.       Patrick Quinones PA-C  03/29/21 1916

## 2021-03-29 NOTE — ED PROVIDER NOTES
1900: I received patient in signout from Patrick Quinones.  Please refer to their complete H&P for further information.  Briefly, patient presented with acute on chronic back pain presenting with R. Foot drop for the past few weeks. At time of signout, MRI lumbar pending.       Lumbar spine MRI w & w/o contrast - surgery <10yrs   Preliminary Result   IMPRESSION:   1.  No definite evidence of acute lumbar spine pathology.   2.  Postop laminectomy with anterior and posterior fusion L4-L5-S1 as well as remote appearing L2-L3 laminectomy with fusion across the disc space. No definite MR evidence of acute hardware failure, discitis, osteomyelitis or epidural abscess. A trace of    nerve root clumping at left L2-L3 cannot be excluded, without enhancement.   3.  Left foraminal and lateral disc protrusion/extrusion at L3-L4 resulting in left neural foraminal stenosis and likely contact of the left L3 nerve root.   4.  Moderate to moderately severe neural foraminal stenosis at T12-L1 right more than left.   5.  Please see report for further details.          I reevaluated the patient.  Discussed MRI images with patient.  Noted L3/L4 disc extrusion with likely contact of L3 nerve root.  Patient without evidence of cauda equina, discitis, epidural abscess. Given chronicity of his symptoms I do not feel emergent decompression is warranted at this time though do feel urgent ortho f/u is warranted.  I attempted to contact his ortho team.  Patient has been on outpatient prednisone recommended he continue this.  He will be dispo'd home with oxycodone for breakthrough pain. Return for worsening weakness, paresthesias, bladder/bowel incontinence, saddle paresthesias.  All questions addressed.        Violet Yu,   03/29/21 2000

## 2021-03-30 NOTE — ED NOTES
Musculoskeletal - CMS Intact: Yes  Musculoskeletal Comment: PT COMES IN WITH LOWER BACK PAIN WORSE ON THE RIGHT AND RADIATING DOWN RIGHT LEG, SOME RIGHT FOOT INTERMITTENT FOOT DROP. PT PRIMARY REFERRED THAT HE COME TO ER FRO MRI. PT HAS HX OF BACK SURG. DENIES ANY RECENT INJURY. PT IS ON ROXICODONE AND FENT. PATCH DAILY.

## 2023-10-27 ENCOUNTER — HOSPITAL ENCOUNTER (EMERGENCY)
Facility: CLINIC | Age: 68
Discharge: HOME | End: 2023-10-27
Admitting: STUDENT IN AN ORGANIZED HEALTH CARE EDUCATION/TRAINING PROGRAM
Payer: COMMERCIAL

## 2023-10-27 VITALS
HEART RATE: 58 BPM | SYSTOLIC BLOOD PRESSURE: 144 MMHG | TEMPERATURE: 98.1 F | DIASTOLIC BLOOD PRESSURE: 85 MMHG | OXYGEN SATURATION: 98 % | RESPIRATION RATE: 20 BRPM

## 2023-10-27 LAB
ALBUMIN UR-MCNC: NEGATIVE MG/DL
ANION GAP SERPL CALCULATED.3IONS-SCNC: 6 MMOL/L (ref 7–15)
APPEARANCE UR: CLEAR
BASOPHILS # BLD AUTO: 0 10E3/UL (ref 0–0.2)
BASOPHILS NFR BLD AUTO: 1 %
BILIRUB UR QL STRIP: NEGATIVE
BUN SERPL-MCNC: 12.1 MG/DL (ref 8–23)
CALCIUM SERPL-MCNC: 9.1 MG/DL (ref 8.8–10.2)
CHLORIDE SERPL-SCNC: 101 MMOL/L (ref 98–107)
COLOR UR AUTO: YELLOW
CREAT SERPL-MCNC: 0.94 MG/DL (ref 0.67–1.17)
DEPRECATED HCO3 PLAS-SCNC: 30 MMOL/L (ref 22–29)
EGFRCR SERPLBLD CKD-EPI 2021: 88 ML/MIN/1.73M2
EOSINOPHIL # BLD AUTO: 0.1 10E3/UL (ref 0–0.7)
EOSINOPHIL NFR BLD AUTO: 2 %
ERYTHROCYTE [DISTWIDTH] IN BLOOD BY AUTOMATED COUNT: 14 % (ref 10–15)
GLUCOSE SERPL-MCNC: 95 MG/DL (ref 70–99)
GLUCOSE UR STRIP-MCNC: NEGATIVE MG/DL
HCT VFR BLD AUTO: 38.7 % (ref 40–53)
HGB BLD-MCNC: 12.7 G/DL (ref 13.3–17.7)
HGB UR QL STRIP: NEGATIVE
HOLD SPECIMEN: NORMAL
HOLD SPECIMEN: NORMAL
IMM GRANULOCYTES # BLD: 0 10E3/UL
IMM GRANULOCYTES NFR BLD: 0 %
KETONES UR STRIP-MCNC: NEGATIVE MG/DL
LEUKOCYTE ESTERASE UR QL STRIP: NEGATIVE
LYMPHOCYTES # BLD AUTO: 1.9 10E3/UL (ref 0.8–5.3)
LYMPHOCYTES NFR BLD AUTO: 25 %
MCH RBC QN AUTO: 29.5 PG (ref 26.5–33)
MCHC RBC AUTO-ENTMCNC: 32.8 G/DL (ref 31.5–36.5)
MCV RBC AUTO: 90 FL (ref 78–100)
MONOCYTES # BLD AUTO: 0.6 10E3/UL (ref 0–1.3)
MONOCYTES NFR BLD AUTO: 8 %
MUCOUS THREADS #/AREA URNS LPF: PRESENT /LPF
NEUTROPHILS # BLD AUTO: 5 10E3/UL (ref 1.6–8.3)
NEUTROPHILS NFR BLD AUTO: 64 %
NITRATE UR QL: POSITIVE
NRBC # BLD AUTO: 0 10E3/UL
NRBC BLD AUTO-RTO: 0 /100
PH UR STRIP: 5 [PH] (ref 5–7)
PLATELET # BLD AUTO: 228 10E3/UL (ref 150–450)
POTASSIUM SERPL-SCNC: 4.4 MMOL/L (ref 3.4–5.3)
RBC # BLD AUTO: 4.31 10E6/UL (ref 4.4–5.9)
RBC URINE: 4 /HPF
SODIUM SERPL-SCNC: 137 MMOL/L (ref 135–145)
SP GR UR STRIP: 1.02 (ref 1–1.03)
UROBILINOGEN UR STRIP-MCNC: NORMAL MG/DL
WBC # BLD AUTO: 7.7 10E3/UL (ref 4–11)
WBC URINE: 1 /HPF

## 2023-10-27 PROCEDURE — 87086 URINE CULTURE/COLONY COUNT: CPT | Performed by: EMERGENCY MEDICINE

## 2023-10-27 PROCEDURE — 81001 URINALYSIS AUTO W/SCOPE: CPT | Performed by: EMERGENCY MEDICINE

## 2023-10-27 PROCEDURE — 36415 COLL VENOUS BLD VENIPUNCTURE: CPT | Performed by: STUDENT IN AN ORGANIZED HEALTH CARE EDUCATION/TRAINING PROGRAM

## 2023-10-27 PROCEDURE — 99281 EMR DPT VST MAYX REQ PHY/QHP: CPT

## 2023-10-27 PROCEDURE — 80048 BASIC METABOLIC PNL TOTAL CA: CPT | Performed by: STUDENT IN AN ORGANIZED HEALTH CARE EDUCATION/TRAINING PROGRAM

## 2023-10-27 PROCEDURE — 85025 COMPLETE CBC W/AUTO DIFF WBC: CPT | Performed by: STUDENT IN AN ORGANIZED HEALTH CARE EDUCATION/TRAINING PROGRAM

## 2023-10-27 RX ORDER — OXYCODONE HYDROCHLORIDE 5 MG/1
5 TABLET ORAL ONCE
Status: DISCONTINUED | OUTPATIENT
Start: 2023-10-27 | End: 2023-10-27 | Stop reason: HOSPADM

## 2023-10-27 ASSESSMENT — ACTIVITIES OF DAILY LIVING (ADL): ADLS_ACUITY_SCORE: 33

## 2023-10-27 NOTE — ED NOTES
PIT/Triage Evaluation    Patient presented with stabbing low back pain and worsened numbness and weakness in his bilateral lower extremities, worsened on the right side. Patient states that he has an electric stimulator in his back and believes a lead may have moved out of place last night, causing his symptoms. He also noticed a new lump in his low back this morning He is now having a hard time walking due to the pain and weakness. No recent falls or trauma to the back. Denies associated fevers, chills, urinary or bowel retention or incontinence, numbness in the groin, slurred speech, vision changes, and numbness or weakness in the arms. Patient's wife adds that he contacted his pain clinic today and they recommended he be evaluated in the ED.    Exam is notable for:  Midline lumbar spine tenderness to palpation, with no underlying erythema or edema.  Decreased sensation in the right lower extremity.  Strength 5/5 in the bilateral lower extremities.    Appropriate interventions for symptom management were initiated if applicable.  Appropriate diagnostic tests were initiated if indicated.    Important information for subsequent clinician:  I ordered a CT of the lumbar spine pain medication.  The patient may require an MRI, but we will be starting with the CT.    I briefly evaluated the patient and developed an initial plan of care. I discussed this plan and explained that this brief interaction does not constitute a full evaluation. Patient/family understands that they should wait to be fully evaluated and discuss any test results with another clinician prior to leaving the hospital.    Scribe Disclosure:  I, Ivory Blake, am serving as a scribe at 5:06 PM on 10/27/2023 to document services personally performed by Bryce Salinas MD based on my observations and the provider's statements to me.     Bryce Salinas MD on 10/27/2023 at 5:21 PM         Bryce Salinas MD  10/27/23 1933

## 2023-10-27 NOTE — ED TRIAGE NOTES
Pt here for low back pain w/ numbness and weakness of his bilateral lower extremities. Denies loss of bowel or bladder. Does have an implanted stimulator and hx of multiple back surgeries. Advised to be evaluated for malfunction of the stimulator.

## 2023-10-29 LAB — BACTERIA UR CULT: NO GROWTH
